# Patient Record
Sex: MALE | Race: WHITE | Employment: OTHER | URBAN - METROPOLITAN AREA
[De-identification: names, ages, dates, MRNs, and addresses within clinical notes are randomized per-mention and may not be internally consistent; named-entity substitution may affect disease eponyms.]

---

## 2021-11-09 ENCOUNTER — HOSPITAL ENCOUNTER (OUTPATIENT)
Dept: PHYSICAL THERAPY | Age: 66
Discharge: HOME | End: 2021-11-09
Payer: MEDICARE

## 2021-11-09 PROCEDURE — 97110 THERAPEUTIC EXERCISES: CPT

## 2021-11-09 PROCEDURE — 97161 PT EVAL LOW COMPLEX 20 MIN: CPT

## 2021-11-09 NOTE — PROGRESS NOTES
PHYSICAL THERAPY EVALUATION / DAILY TREATMENT      Patient Name: Joe Fuller  Date: 2021  : 1955  [x] Patient  Verified  Payor: Jalen Alt / Plan: [place] / Product Type: Medicare /    In Time: 12:30  Out Time: 1:11  Total Treatment Time (min): 41  Visit #: 1 of 16    Medicare / Robe Carvalho Only   Total Timed Codes (min):  8 1:1 Treatment Time:  41     Treatment Area: Pain in left shoulder [M25.512]  Impingement syndrome of left shoulder [M75.42]    SUBJECTIVE:  Chief Complaint/Mechanism of Injury:   Patient is a very pleasant 73 y.o. male with c/o left shoulder pain that started while doing household projects about 3 months ago. Patient states he tripped on something in the garage and fell directly on his left shoulder. Patient states his pain is now a \"constant ache\" along the top of his left shoulder. He c/o difficulty and/or painful when reaching behind him to the left, reaching > 90 degrees into both flexion and scaption. He points to the anterior aspect of his left GHJ as the location of his pain, but states there is some minor ache in his left deltoid region. He denies any symptoms in his distal UE and also no c/o tingling, numbness, or burning. He also denies any known weakness of his left distal UE, but feels as though he is weak in his left shoulder. He c/o difficulty with lifting more than a few pounds, he prefers to lift with his contralateral UE at this time. He denies any prior history of injury of either shoulder, but does reports several years of intermittent cervical pain including years of limited cervical rotation. Patient states years ago he was told he has cervical DDD, but he is unsure of the levels that were involved. Patient states his neck pain over the last year has improved, but it did flare up slightly after his fall when he landed on his shoulder. Patient is left hand dominant, including writing and throwing.   He is retired, his hobbies include hunting and traveling to the mountains. 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 Patient reports receiving a cortisone injection about a week ago, which he has found helpful. Pain Level (out of 0-10 scale): pain ranges from 2-9/10, currently 5/10  [] constant [x] intermittent [x] improving [] worsening [] no change since onset  Alleviating Factors: injection, Advil 3x/day  Previous Treatment for Current Injury: injections  Diagnostic Imaging for Current Injury: x-rays - no fractures  Hand Dominance: [] right handed [x] left handed [] ambidextrous  Living Situation: lives with wife in a 2 story home with 3 steps to enter, [] with handrail  Prior Level of Function:  Yardwork, hunting, traveling  [x] Unrestricted with functional activities and ADLs  [] No assistive device  [x] active lifestyle [] moderately active lifestyle [] sedentary lifestyle  [] Other:     Substance Use: [] tobacco use [x] alcohol use [] other:     Comorbidities: chronic neck pain with limited cervical rotation, cervical DDD, DM type 2, OA left shoulder and left knee, hearing impaired (wears hearing aides), left knee meniscal surgery about 12 years ago    Patients Goals:  \"to have more movement without pain\"    Potential Barriers for PT: [] pain [] financial [] time [] transportation [] other:  Motivation: Good  Cognition: A&O x 3  Denies Red Flags: SOB, chest pain, dizziness/lightheadedness, blurred/double vision, HA, chills/fevers, night sweats, change in bowel/bladder control, abdominal pain, difficulty swallowing, slurred speech, unexplained weight gain/loss, nausea, and/or vomiting.   Any medication changes, allergies to medications, adverse drug reactions, diagnosis change, or new procedure performed?: [x] No     [] Yes (see summary sheet for update)      OBJECTIVE/EXAMINATION:    33 min [x] Eval                  [] Re-Evaluation     8 min Therapeutic Exercise:  [x] See flow sheet :   Rationale: patient education of shoulder anatomy including the rotator cuff, cervical anatomy, and posture        With   [x] TE   [] TA   [] Neuro   [] Other: Patient Education: [x] Review HEP    [] Progressed/Changed HEP based on:   [] positioning   [] body mechanics   [] transfers   [] heat/ice application    [] other:      Physical Therapy Evaluation:    Inspection:   [x] Patient in no apparent distress                   [] Patient in obvious distress  [x] No visible signs/symptoms of infection  [] Edema present: [] mild [] moderate [] severe [] 1+ pitting [] 2+ pitting   Location:   [] Ecchymosis present: [] mild [] moderate [] severe   Location:     Posture: mildly kyphotic posture with loss of cervical lordosis and forward shoulders    Gait: WNL    Palpation: TTP along anterior GHJ at attachment of RC tendons    AROM (in degrees): Cervical AROM: flexion to chest, ext to 20 deg, left rotation to 41 deg, right rotation to 62 deg, left sidebending to 17 deg, right sidebending to 22 deg; all movements without c/o pain however c/o stiffness ; Bilateral UE AROM: shoulder flexion 140 deg with pain starting around 100 degrees, scaption 125 deg with pain starting around 90 degrees, ER 50 deg (at 0 deg)    MMT: Grossly 4+/5 bilaterally except left shoulder ER 4-/5 (all within available range)    Girth Measurements (in cm): n/a    Special Tests: - empty can, - Acuña/Lloyd    Other Comments: none     Pain Level (out of 0-10 scale) Post Treatment: 5      ASSESSMENT:  Patient is a very pleasant 73 y.o. male presenting to skilled PT with c/o left shoulder pain after tripping on an object in his garage and landing directly on his left shoulder about 3-4 months ago. Patient's symptoms are consistent with rotator cuff pathology, though there is no obvious clinical presentation of a tear.   He presents today with limitations in posture, cervical and shoulder AROM, UE strength, thus all contributing to deficits with the overall functional use of his left UE. Patient is left hand dominant and states he is currently having to use his right UE to do most lifting, pushing, and pulling, because of left shoulder pain. Patient would benefit from skilled PT services to modify and progress therapeutic interventions, address functional mobility deficits, address ROM deficits, address strength deficits, analyze and address soft tissue restrictions, analyze and cue movement patterns, analyze and modify body mechanics/ergonomics, and assess and modify postural abnormalities to attain remaining goals. Patient did well with today's evaluation and initiation of treatment. Patient was educated in [address] and all questions were answered. [x]  See Plan of Care  []  See Progress Note/Recertification  []  See Discharge Summary         GOALS:  Short Term Goals: to be accomplished within 3 weeks  1. Patient will report compliance with prescribed HEP at least 1x/day in order to assist in progression towards goals and restore functional mobility. Eval Status: HEP issued at eval  Current Status: same as eval    2. Patient will report at least a 50% reduction in pain when performing ADLs/functional activities in order to improve overall tolerance to functional movements and progress towards patient's PLOF. Eval Status: pain ranges from 2-9/10 with all use/mobility of his left shoulder  Current Status: same as eval    Long Term Goals: to be accomplished within 8 weeks  1. Patient will score at least 68 points on FOTO in order to maximize function and promote patient satisfaction with overall outcome. (Cheryl Dice is an established functional score where 100 = no disability)  Eval Status: 53  Current Status: same as eval    2. Patient will demonstrate functional AROM that is with 0-2/10 pain in order to return to functional activities and mobility.   Eval Status: Cervical AROM: flexion to chest, ext to 20 deg, left rotation to 41 deg, right rotation to 62 deg, left sidebending to 17 deg, right sidebending to 22 deg; all movements without c/o pain however c/o stiffness ; Bilateral UE AROM: shoulder flexion 140 deg with pain starting around 100 degrees, scaption 125 deg with pain starting around 90 degrees, ER 50 deg (at 0 deg)  Current Status: same as eval    3. Patient will demonstrate at least 5/5 strength bilaterally in order to be able to safely perform functional activities. Eval Status: Grossly 4+/5 bilaterally except left shoulder ER 4-/5 (all within available range)  Current Status: same as eval    4. Patient will be painfree during all functional activities and ADLs in order to improve QOL and return to patient's PLOF. Eval Status: pain ranges from 2-9/10 with all use/mobility of his left shoulder  Current Status: same as eval      PLAN  [x]  Continue Plan of Care  []  Upgrade Activities as Tolerated       [x]  Update Interventions per Flow Sheet, as Tolerated by Patient       []  Discharge Due To:   []  Other: Thank you for the referral to [facility]. Fernanda Boswell.  Marci, PT, DPT, ATR     2021     10:08 AM      Patient Name: Jie Christianson     Patient : 1955

## 2021-11-09 NOTE — PROGRESS NOTES
[facility] at [address] [place] [address]  Phone: [phone]   Fax: [phone]    Plan of Care/ Statement of Necessity for Physical Therapy Services     Patient name: Kathrin Ramirez Start of Care: 2021   Referral source: Donn Mohan MD : 1955    Medical Diagnosis: Pain in left shoulder [M25.512]  Impingement syndrome of left shoulder [M75.42]   Onset Date:3-4 months ago    Treatment Diagnosis: left shoulder pain with possible rotator cuff pathology   Prior Hospitalization: see medical history Provider#: [id number]   Medications: Verified on Patient summary List    Comorbidities: chronic neck pain with limited cervical rotation, cervical DDD, DM type 2, OA left shoulder and left knee, hearing impaired (wears hearing aides), left knee meniscal surgery about 12 years ago   Prior Level of Function: unrestricted with all functional activities/movements, left hand dominant, active with hunting, traveling, and yardwork    The [address] [place] [address] and following information is based on the information from the initial evaluation. Assessment/ key information: Patient is a very pleasant 73 y.o. male presenting to skilled PT with c/o left shoulder pain after tripping on an object in his garage and landing directly on his left shoulder about 3-4 months ago. Patient's symptoms are consistent with rotator cuff pathology, though there is no obvious clinical presentation of a tear. He presents today with limitations in posture, cervical and shoulder AROM, UE strength, thus all contributing to deficits with the overall functional use of his left UE.   Patient is left hand dominant and states he is currently having to use his right UE to do most lifting, pushing, and pulling, because of left shoulder pain.     Patient would benefit from skilled PT services to modify and progress therapeutic interventions, address functional mobility deficits, address ROM deficits, address strength deficits, analyze and address soft tissue restrictions, analyze and cue movement patterns, analyze and modify body mechanics/ergonomics, and assess and modify postural abnormalities to attain remaining goals. Evaluation Complexity History MEDIUM  Complexity : 1-2 comorbidities / personal factors will impact the outcome/ POC ; Examination LOW Complexity : 1-2 Standardized tests and measures addressing body structure, function, activity limitation and / or participation in recreation  ;Presentation LOW Complexity : Stable, uncomplicated  ;Clinical Decision Making MEDIUM Complexity : FOTO score of 26-74  Overall Complexity Rating: LOW   Problem List: pain affecting function, decrease ROM, decrease strength, decrease ADL/ functional abilitiies, decrease activity tolerance and decrease flexibility/ joint mobility   Treatment Plan may include any combination of the following: Therapeutic exercise, Therapeutic activities, Neuromuscular re-education, Physical agent/modality, Manual therapy, Aquatic therapy, Patient education, Self Care training and Functional mobility training  Patient / Family readiness to learn indicated by: asking questions, trying to perform skills and interest  Persons(s) to be included in education: patient (P)  Barriers to Learning/Limitations: None  Measures taken if barriers to learning:   Patient Goal (s): to have more movement without pain\"  Patient Self Reported Health Status: excellent  Rehabilitation Potential: excellent    Short Term Goals: to be accomplished within 3 weeks  1. Patient will report compliance with prescribed HEP at least 1x/day in order to assist in progression towards goals and restore functional mobility. Eval Status: HEP issued at eval  Current Status: same as eval     2.  Patient will report at least a 50% reduction in pain when performing ADLs/functional activities in order to improve overall tolerance to functional movements and progress towards patient's PLOF.  Eval Status: pain ranges from 2-9/10 with all use/mobility of his left shoulder  Current Status: same as eval     Long Term Goals: to be accomplished within 8 weeks  1. Patient will score at least 68 points on FOTO in order to maximize function and promote patient satisfaction with overall outcome. (Viona Villanova is an established functional score where 100 = no disability)  Eval Status: 53  Current Status: same as eval     2. Patient will demonstrate functional AROM that is with 0-2/10 pain in order to return to functional activities and mobility. Eval Status: Cervical AROM: flexion to chest, ext to 20 deg, left rotation to 41 deg, right rotation to 62 deg, left sidebending to 17 deg, right sidebending to 22 deg; all movements without c/o pain however c/o stiffness ; Bilateral UE AROM: shoulder flexion 140 deg with pain starting around 100 degrees, scaption 125 deg with pain starting around 90 degrees, ER 50 deg (at 0 deg)  Current Status: same as eval     3. Patient will demonstrate at least 5/5 strength bilaterally in order to be able to safely perform functional activities. Eval Status: Grossly 4+/5 bilaterally except left shoulder ER 4-/5 (all within available range)  Current Status: same as eval     4. Patient will be painfree during all functional activities and ADLs in order to improve QOL and return to patient's PLOF. Eval Status: pain ranges from 2-9/10 with all use/mobility of his left shoulder  Current Status: same as eval    Frequency / Duration: Patient to be seen 2 times per week for 8 weeks. Patient/ Caregiver education and instruction: Diagnosis, prognosis, activity modification   [x]  Plan of care has been reviewed with PTA    Certification Period: 11/9/21-2/7/22    Jolene Howe, PT 11/9/2021 10:08 AM  _____________________________________________________________________  I certify that the above Therapy Services are being furnished while the patient is under my care.  I agree with the treatment plan and certify that this therapy is necessary.     Physician's Signature:____________Date:_________TIME:________                                      Ivanna Avila MD    ** Signature, Date and Time must be completed for valid certification **    Please sign and return to [facility] at [facility], [address] [place] [address]  Phone: [phone]   Fax: [phone]

## 2021-11-10 ENCOUNTER — HOSPITAL ENCOUNTER (OUTPATIENT)
Dept: PHYSICAL THERAPY | Age: 66
Discharge: HOME | End: 2021-11-10
Payer: MEDICARE

## 2021-11-10 PROCEDURE — 97530 THERAPEUTIC ACTIVITIES: CPT

## 2021-11-10 PROCEDURE — 97110 THERAPEUTIC EXERCISES: CPT

## 2021-11-10 NOTE — PROGRESS NOTES
PT DAILY TREATMENT NOTE    Patient Name: Joe Fuller  Date: 11/10/2021  : 1955  [x]  Patient  Verified  Payor: Jalen Alt / Plan: [place] MEDICARE PART A & B / Product Type: Medicare /    In Time: 8:00  Out Time: 8:46  Total Treatment Time (min): 46  Total Timed Codes (min): 46  1:1 Treatment Time ([facility] only): 45   Visit #:     Treatment Dx: Pain in left shoulder [M25.512]  Impingement syndrome of left shoulder [M75.42]    SUBJECTIVE  Pre-Treatment Pain Level (0-10 scale): 3    Any medication changes, allergies to medications, adverse drug reactions, diagnosis change, or new procedure performed?: [x] No    [] Yes (see summary sheet for update)    Subjective Functional Status/Changes:  [] No changes reported  Patient reports no issues after his initial evaluation. No changes in symptoms as of yet. OBJECTIVE    25 min Therapeutic Exercise:  [x] See flow sheet   Rationale: increase ROM, increase strength, and decrease pain to improve the patients ability to perform ADLs    13 min Therapeutic Activity:  [x] See flow sheet   Rationale: increase ROM, increase strength, and improve coordination to improve the patients ability to perform ADLs      With   [x] TE   [] TA   [] neuro   [] other: Patient Education: [x] Review HEP    [] Progressed/Changed HEP based on:   [] positioning   [] body mechanics   [] transfers   [] heat/ice application    [] other:      Other Objective/Functional Measures: N/A     Pain Level (0-10 scale) Post Treatment: 3    ASSESSMENT/Changes in Function:  Patient responded well to today's treatment without any adverse reactions. Patient did well with the initiation of exercises today, which included: UBE, pulleys, standing wand flexion/scaption, flex/scap to 90 deg with light weight, shrugs, bicep curls, rows/exts with resistance band, and IR/ER with resistance band. Cues given for upright posture and scapular stabilization.   HEP reviewed and given to patient today, in addition to green resistance bands for home use. Patient will continue to benefit from skilled PT services to further modify and progress therapeutic interventions, address functional mobility deficits, address ROM deficits, address strength deficits, analyze and address soft tissue restrictions, analyze and cue movement patterns, analyze and modify body mechanics/ergonomics, assess and modify postural abnormalities, and instruct in home and community integration to attain remaining goals. [x]  See Plan of Care  []  See Progress Note/Recertification  []  See Discharge Summary         Progress Towards Goals/Updated Goals:  Short Term Goals: to be accomplished within 3 weeks  1. Patient will report compliance with prescribed HEP at least 1x/day in order to assist in progression towards goals and restore functional mobility. Eval Status: issued at second visit, 11/10/21  Current Status: same as eval     2. Patient will report at least a 50% reduction in pain when performing ADLs/functional activities in order to improve overall tolerance to functional movements and progress towards patient's PLOF. Eval Status: pain ranges from 2-9/10 with all use/mobility of his left shoulder  Current Status: same as eval     Long Term Goals: to be accomplished within 8 weeks  1. Patient will score at least 68 points on FOTO in order to maximize function and promote patient satisfaction with overall outcome. (Sheridan Abraham is an established functional score where 100 = no disability)  Eval Status: 53  Current Status: same as eval     2. Patient will demonstrate functional AROM that is with 0-2/10 pain in order to return to functional activities and mobility.   Eval Status: Cervical AROM: flexion to chest, ext to 20 deg, left rotation to 41 deg, right rotation to 62 deg, left sidebending to 17 deg, right sidebending to 22 deg; all movements without c/o pain however c/o stiffness ; Bilateral UE AROM: shoulder flexion 140 deg with pain starting around 100 degrees, scaption 125 deg with pain starting around 90 degrees, ER 50 deg (at 0 deg)  Current Status: same as eval     3. Patient will demonstrate at least 5/5 strength bilaterally in order to be able to safely perform functional activities. Eval Status: Grossly 4+/5 bilaterally except left shoulder ER 4-/5 (all within available range)  Current Status: same as eval     4. Patient will be painfree during all functional activities and ADLs in order to improve QOL and return to patient's PLOF. Eval Status: pain ranges from 2-9/10 with all use/mobility of his left shoulder  Current Status: same as eval    PLAN  []  Upgrade Activities as Tolerated     [x]  Continue Plan of Care  []  Update Interventions per Flow Sheet       []  Discharge Due To:_  []  Other:_      Russella Nails.  Marci, PT, DPT, ATR  11/10/2021 7:50 AM    Future Appointments   Date Time Provider Yue Stout   11/10/2021  8:00 AM Kt Covarrubias Nails, PT [facility]   11/15/2021  8:00 AM Kt Covarrubias Nails, PT [facility]   11/17/2021  8:45 AM Johnnie Covarrubiasa Nails, PT [facility]   11/22/2021  8:00 AM Kt Covarrubias Nails, PT [facility]   11/23/2021  8:00 AM Jordana Sullivan, PT [facility]   11/29/2021  8:00 AM Kt Covarrubias Nails, PT [facility]   12/1/2021  8:45 AM Johnnie Covarrubiasa Nails, PT [facility]

## 2021-11-15 ENCOUNTER — HOSPITAL ENCOUNTER (OUTPATIENT)
Dept: PHYSICAL THERAPY | Age: 66
Discharge: HOME | End: 2021-11-15
Payer: MEDICARE

## 2021-11-15 PROCEDURE — 97110 THERAPEUTIC EXERCISES: CPT

## 2021-11-15 PROCEDURE — 97530 THERAPEUTIC ACTIVITIES: CPT

## 2021-11-15 NOTE — PROGRESS NOTES
PT DAILY TREATMENT NOTE    Patient Name: Dulce Ramon  Date: 11/15/2021  : 1955  [x]  Patient  Verified  Payor: [employer] / Plan: [place] / Product Type: Medicare /    In Time: 7:57  Out Time: 8:51  Total Treatment Time (min): 54  Total Timed Codes (min): 54  1:1 Treatment Time ( [address] only): 48   Visit #: 3/16    Treatment Dx: Pain in left shoulder [M25.512]  Impingement syndrome of left shoulder [M75.42]    SUBJECTIVE  Pre-Treatment Pain Level (0-10 scale): 1    Any medication changes, allergies to medications, adverse drug reactions, diagnosis change, or new procedure performed?: [x] No    [] Yes (see summary sheet for update)    Subjective Functional Status/Changes:  [] No changes reported  \"My shoulder feels much better already. I was even able to throw a ball this weekend. I only threw it once though just to test it out. \"  Patient states he didn't have any lower weights, so he's been using 10# weights at home for his exercises. OBJECTIVE    30 min Therapeutic Exercise:  [x] See flow sheet   Rationale: increase ROM, increase strength, and decrease pain to improve the patients ability to perform ADLs    23 min Therapeutic Activity:  [x] See flow sheet   Rationale: increase ROM, increase strength, and improve coordination to improve the patients ability to perform ADLs      With   [x] TE   [] TA   [] neuro   [] other: Patient Education: [x] Review HEP    [] Progressed/Changed HEP based on:   [] positioning   [] body mechanics   [] transfers   [] heat/ice application    [] other:      Other Objective/Functional Measures: N/A     Pain Level (0-10 scale) Post Treatment: 0    ASSESSMENT/Changes in Function:  Patient responded well to today's treatment without any adverse reactions. Patient educated on importance of lesser weights at this time to avoid reinjuring his shoulder. AROM seems to be improving.   Added further scapular and cervical stabilization exercises to today's exercises. Patient will continue to benefit from skilled PT services to further modify and progress therapeutic interventions, address functional mobility deficits, address ROM deficits, address strength deficits, analyze and address soft tissue restrictions, analyze and cue movement patterns, analyze and modify body mechanics/ergonomics, assess and modify postural abnormalities, and instruct in home and community integration to attain remaining goals. [x]  See Plan of Care  []  See Progress Note/Recertification  []  See Discharge Summary         Progress Towards Goals/Updated Goals:    Short Term Goals: to be accomplished within 3 weeks  1. Patient will report compliance with prescribed HEP at least 1x/day in order to assist in progression towards goals and restore functional mobility. Eval Status: issued at second visit, 11/10/21  Current Status: Patient reports compliance with his HEP 1x/day. 11/15/21, met     2. Patient will report at least a 50% reduction in pain when performing ADLs/functional activities in order to improve overall tolerance to functional movements and progress towards patient's PLOF. Eval Status: pain ranges from 2-9/10 with all use/mobility of his left shoulder  Current Status: same as eval      Long Term Goals: to be accomplished within 8 weeks  1. Patient will score at least 68 points on FOTO in order to maximize function and promote patient satisfaction with overall outcome.  (FOTO is an established functional score where 100 = no disability)  Eval Status: 53  Current Status: same as eval     2.  Patient will demonstrate functional AROM that is with 0-2/10 pain in order to return to functional activities and mobility.   Eval Status: Cervical AROM: flexion to chest, ext to 20 deg, left rotation to 41 deg, right rotation to 62 deg, left sidebending to 17 deg, right sidebending to 22 deg; all movements without c/o pain however c/o stiffness ; Bilateral UE AROM: shoulder flexion 140 deg with pain starting around 100 degrees, scaption 125 deg with pain starting around 90 degrees, ER 50 deg (at 0 deg)  Current Status: same as eval     3.  Patient will demonstrate at least 5/5 strength bilaterally in order to be able to safely perform functional activities. Eval Status: Grossly 4+/5 bilaterally except left shoulder ER 4-/5 (all within available range)  Current Status: same as eval     4.  Patient will be painfree during all functional activities and ADLs in order to improve QOL and return to patient's PLOF. Eval Status: pain ranges from 2-9/10 with all use/mobility of his left shoulder  Current Status: same as eval    PLAN  []  Upgrade Activities as Tolerated     [x]  Continue Plan of Care  []  Update Interventions per Flow Sheet       []  Discharge Due To:_  []  Other:_      Yocasta Merritt.  JEANNE Covarrubias, DPT, ATR  11/15/2021 8:05 AM    Future Appointments   Date Time Provider Yue Stout   11/17/2021  8:45 AM [facility] [place] [facility]   11/22/2021  8:00 AM Yocasta Covarrubias, PT [facility]   11/23/2021  8:00 AM JEANNE LebronTREBECCA THE [facility]   11/29/2021  8:00 AM Yocasta Covarrubias, PT [facility]   12/1/2021  8:45 AM Ning Romano PT [facility]

## 2021-11-17 ENCOUNTER — HOSPITAL ENCOUNTER (OUTPATIENT)
Dept: PHYSICAL THERAPY | Age: 66
Discharge: HOME | End: 2021-11-17
Payer: MEDICARE

## 2021-11-17 PROCEDURE — 97110 THERAPEUTIC EXERCISES: CPT

## 2021-11-17 PROCEDURE — 97530 THERAPEUTIC ACTIVITIES: CPT

## 2021-11-17 NOTE — PROGRESS NOTES
PT DAILY TREATMENT NOTE    Patient Name: Meena Javier  Date:2021  : 1955  [x]  Patient  Verified  Payor: Yane [employer] / Plan: [place] MED[place] PART A [place] / Product Type: Medicare /    In time:842  Out time:931  Total Treatment Time (min): 49  Total Timed Codes (min): 49  1:1 Treatment Time ( [address] only): 37   Visit #: 4 of 16    Treatment Dx: Pain in left shoulder [M25.512]  Impingement syndrome of left shoulder [M75.42]    SUBJECTIVE  Pain Level (0-10 scale): 0  Any medication changes, allergies to medications, adverse drug reactions, diagnosis change, or new procedure performed?: [x] No    [] Yes (see summary sheet for update)  Subjective functional status/changes:   [] No changes reported  \"The pain has come way down since I started the exercises. Still hurts at the front when I touch it and when I move certain ways. \"    OBJECTIVE    30 min Therapeutic Exercise:  [x] See flow sheet :   Rationale: increase ROM, increase strength and decrease pain to improve the patients ability to complete ADLs  ambulation safety and efficiency in order to improve patient's ability to safely ambulate at home for self care. 13 min Therapeutic Activity:  [x]  See flow sheet :   Rationale: increase ROM, increase strength and improve coordination  to improve the patients ability to Complete ADLS     With   [] TE   [] TA   [] neuro   [] other: Patient Education: [x] Review HEP    [] Progressed/Changed HEP based on:   [] positioning   [] body mechanics   [] transfers   [] heat/ice application    [] other:      Other Objective/Functional Measures: NA     Pain Level (0-10 scale) post treatment: 0    ASSESSMENT/Changes in Function: Patient noting marked decrease in pain intensity with gentle exercises. Patient requires moderate intermittent verbal and visual cues for technique and sequencing of exercise program. Patient responds well to treatment session. No adverse effects were noted from today's treatment session. Patient will continue to benefit from skilled PT services to modify and progress therapeutic interventions, address functional mobility deficits, address ROM deficits, address strength deficits, analyze and address soft tissue restrictions, analyze and cue movement patterns, analyze and modify body mechanics/ergonomics, assess and modify postural abnormalities,  and instruct in home and community integration to attain remaining goals. [x]  See Plan of Care  []  See progress note/recertification  []  See Discharge Summary         Progress towards goals / Updated goals:  Short Term Goals: to be accomplished within 3 weeks  1. Patient will report compliance with prescribed HEP at least 1x/day in order to assist in progression towards goals and restore functional mobility. Eval Status: issued at second visit, 11/10/21  Current Status: Patient reports compliance with his HEP 1x/day. 11/15/21, met     2. Patient will report at least a 50% reduction in pain when performing ADLs/functional activities in order to improve overall tolerance to functional movements and progress towards patient's PLOF. Eval Status: pain ranges from 2-9/10 with all use/mobility of his left shoulder  Current Status: same as eval      Long Term Goals: to be accomplished within 8 weeks  1. Patient will score at least 68 points on FOTO in order to maximize function and promote patient satisfaction with overall outcome.  (FOTO is an established functional score where 100 = no disability)  Eval Status: 53  Current Status: same as eval     2.  Patient will demonstrate functional AROM that is with 0-2/10 pain in order to return to functional activities and mobility.   Eval Status: Cervical AROM: flexion to chest, ext to 20 deg, left rotation to 41 deg, right rotation to 62 deg, left sidebending to 17 deg, right sidebending to 22 deg; all movements without c/o pain however c/o stiffness ; Bilateral UE AROM: shoulder flexion 140 deg with pain starting around 100 degrees, scaption 125 deg with pain starting around 90 degrees, ER 50 deg (at 0 deg)  Current Status: same as eval     3.  Patient will demonstrate at least 5/5 strength bilaterally in order to be able to safely perform functional activities. Eval Status: Grossly 4+/5 bilaterally except left shoulder ER 4-/5 (all within available range)  Current Status: same as eval     4.  Patient will be painfree during all functional activities and ADLs in order to improve QOL and return to patient's PLOF.   Eval Status: pain ranges from 2-9/10 with all use/mobility of his left shoulder  Current Status: same as eval    PLAN  []  Upgrade activities as tolerated     [x]  Continue plan of care  []  Update interventions per flow sheet       []  Discharge due to:_  []  Other:_      Yves Garcia, PT 11/17/2021  8:47 AM    Future Appointments   Date Time Provider Yue Stout   11/22/2021  8:00 AM Sandip Covarrubias, PT [facility]   11/23/2021  8:00 AM Rosa Arredondo, PT [facility]   11/29/2021  8:00 AM Sandpi Covarrubias, PT [facility]   12/1/2021  8:45 AM Med Mcfarlane, PT [facility]

## 2021-11-22 ENCOUNTER — HOSPITAL ENCOUNTER (OUTPATIENT)
Dept: PHYSICAL THERAPY | Age: 66
Discharge: HOME | End: 2021-11-22
Payer: MEDICARE

## 2021-11-22 PROCEDURE — 97112 NEUROMUSCULAR REEDUCATION: CPT

## 2021-11-22 PROCEDURE — 97110 THERAPEUTIC EXERCISES: CPT

## 2021-11-22 PROCEDURE — 97530 THERAPEUTIC ACTIVITIES: CPT

## 2021-11-22 NOTE — PROGRESS NOTES
PT DAILY TREATMENT NOTE    Patient Name: Eva Robertson  Date: 2021  : 1955  [x]  Patient  Verified  Payor: Pat [employer] / Plan: [place] MED[place] / Product Type: Medicare /    In Time: 7:58  Out Time: 8:57  Total Treatment Time (min): 59  Total Timed Codes (min): 59  1:1 Treatment Time ( [address] only): 48   Visit #:     Treatment Dx: Pain in left shoulder [M25.512]  Impingement syndrome of left shoulder [M75.42]    SUBJECTIVE  Pre-Treatment Pain Level (0-10 scale): 0    Any medication changes, allergies to medications, adverse drug reactions, diagnosis change, or new procedure performed?: [x] No    [] Yes (see summary sheet for update)    Subjective Functional Status/Changes:  [] No changes reported  Patient states he's mostly not having any pain nowadays, except for when he drives with his left UE propped up onto the doorframe. He feels like therapy has been really helping.     OBJECTIVE    17 min Therapeutic Exercise:  [x] See flow sheet   Rationale: increase ROM, increase strength, and decrease pain to improve the patients ability to perform ADLs    26 min Therapeutic Activity:  [x] See flow sheet   Rationale: increase ROM, increase strength, and improve coordination to improve the patients ability to perform ADLs    10 min Neuromuscular Re-Education:  [x] See flow sheet   Rationale: improve coordination, improve balance/proprioception, improve posture, and improve core stabilization to improve the patients ability to perform ADLs and improve stability during static/dynamic movements      With   [x] TE   [] TA   [] neuro   [] other: Patient Education: [x] Review HEP    [] Progressed/Changed HEP based on:   [] positioning   [] body mechanics   [] transfers   [] heat/ice application    [] other:      Other Objective/Functional Measures: N/A     Pain Level (0-10 scale) Post Treatment: 0    ASSESSMENT/Changes in Function:  Patient responded well to today's treatment without any adverse reactions. Patient with much improved AROM as compared to his initial visit, however, he does still c/o minor pain at the end-range of flexion. His quality of movement in all planes has also greatly improved. Patient did well with addition of D1/D2 PNF patterns, without any c/o pain. Patient will continue to benefit from skilled PT services to further modify and progress therapeutic interventions, address functional mobility deficits, address ROM deficits, address strength deficits, analyze and address soft tissue restrictions, analyze and cue movement patterns, analyze and modify body mechanics/ergonomics, assess and modify postural abnormalities, and instruct in home and community integration to attain remaining goals. [x]  See Plan of Care  []  See Progress Note/Recertification  []  See Discharge Summary         Progress Towards Goals/Updated Goals:    Short Term Goals: to be accomplished within 3 weeks  1. Patient will report compliance with prescribed HEP at least 1x/day in order to assist in progression towards goals and restore functional mobility. Eval Status: issued at second visit, 11/10/21  Current Status: Patient reports compliance with his HEP 1x/day.    11/15/21, met     2. Patient will report at least a 50% reduction in pain when performing ADLs/functional activities in order to improve overall tolerance to functional movements and progress towards patient's PLOF. Eval Status: pain ranges from 2-9/10 with all use/mobility of his left shoulder  Current Status: pain ranges 0-3 within the last week   11/22/21, met     Long Term Goals: to be accomplished within 8 weeks  1.  Patient will score at least 68 points on FOTO in order to maximize function and promote patient satisfaction with overall outcome.  (FOTO is an established functional score where 100 = no disability)  Eval Status: 53  Current Status: 87 on 11/22/21, met     2.  Patient will demonstrate functional AROM that is with 0-2/10 pain in order to return to functional activities and mobility. Eval Status: Cervical AROM: flexion to chest, ext to 20 deg, left rotation to 41 deg, right rotation to 62 deg, left sidebending to 17 deg, right sidebending to 22 deg; all movements without c/o pain however c/o stiffness ; Bilateral UE AROM: shoulder flexion 140 deg with pain starting around 100 degrees, scaption 125 deg with pain starting around 90 degrees, ER 50 deg (at 0 deg)  Current Status: Left Shoulder AROM: WNL and without pain except flexion to 160 with minor pain at end-range    11/22/21, met     3.  Patient will demonstrate at least 5/5 strength bilaterally in order to be able to safely perform functional activities. Eval Status: Grossly 4+/5 bilaterally except left shoulder ER 4-/5 (all within available range)  Current Status: same as eval     4.  Patient will be painfree during all functional activities and ADLs in order to improve QOL and return to patient's PLOF. Eval Status: pain ranges from 2-9/10 with all use/mobility of his left shoulder  Current Status: pain ranges 0-3 within the last week   11/22/21, met    PLAN  []  Upgrade Activities as Tolerated     [x]  Continue Plan of Care  []  Update Interventions per Flow Sheet       []  Discharge Due To:_  []  Other:_      Deepti Spaulding.  JEANNE Covarrubias, DPT, ATR  11/22/2021 7:56 AM    Future Appointments   Date Time Provider Yue Stout   11/22/2021  8:00 AM Deepti Covarrubias PT [facility]   11/23/2021  8:00 AM Mikaela Perez, JEANNE [facility]   11/29/2021  8:00 AM Deepti Covarrubias, PT [facility]   12/1/2021  8:45 AM Oriana Romano, JEANNE [facility]

## 2021-11-23 ENCOUNTER — HOSPITAL ENCOUNTER (OUTPATIENT)
Dept: PHYSICAL THERAPY | Age: 66
Discharge: HOME | End: 2021-11-23
Payer: MEDICARE

## 2021-11-23 PROCEDURE — 97112 NEUROMUSCULAR REEDUCATION: CPT

## 2021-11-23 PROCEDURE — 97110 THERAPEUTIC EXERCISES: CPT

## 2021-11-23 PROCEDURE — 97530 THERAPEUTIC ACTIVITIES: CPT

## 2021-11-23 NOTE — PROGRESS NOTES
PT DAILY TREATMENT NOTE - [facility]     Patient Name: Joe Fuller  Date:2021  : 1955  [x]  Patient  Verified  Payor: Jalen Alt / Plan: [place] MED[place]ARE PART A & B / Product Type: Medicare /    In ECUL:7692  Out time:0850  Total Treatment Time (min): 53  Total Timed Codes (min): 53   1:1 Treatment Time ( [address] only): 48   Visit #: 6 of 16    Treatment Area: Pain in left shoulder [M25.512]  Impingement syndrome of left shoulder [M75.42]    SUBJECTIVE  Pain Level (0-10 scale): 0  Any medication changes, allergies to medications, adverse drug reactions, diagnosis change, or new procedure performed?: [x] No    [] Yes (see summary sheet for update)  Subjective functional status/changes:   [] No changes reported    Patient reports no new changes since last visit. OBJECTIVE    35 min Therapeutic Exercise:  [x] See flow sheet :   Rationale: increase ROM, increase strength and decrease pain to improve the patients ability to complete ADLs    10 min Therapeutic Activity:  [x]  See flow sheet :   Rationale: increase ROM, increase strength and improve coordination  to improve the patients ability to complete ADLs     8 min Neuromuscular Re-education:  [x]  See flow sheet :   Rationale: improve coordination, improve balance/proprioception, improve posture, and improve core stabilization to improve the patients ability to perform ADLs and improve stability during static/dynamic movements          With   [x] TE   [] TA   [] neuro   [] other: Patient Education: [x] Review HEP    [] Progressed/Changed HEP based on:   [] positioning   [] body mechanics   [] transfers   [] heat/ice application    [] other:      Other Objective/Functional Measures: NA     Pain Level (0-10 scale) post treatment: 0    ASSESSMENT/Changes in Function: Patient responds well to treatment session. Patient required cues to recall exercise parameters. Reviewed activity pacing to promote increased activity tolerance due to muscle recovery time. He demonstrated understanding. Will advance exercise intensity next visit as patient demonstrated improved activity tolerance. No adverse effects were noted from today's treatment session      Patient will continue to benefit from skilled PT services to modify and progress therapeutic interventions, address functional mobility deficits, address ROM deficits, address strength deficits, analyze and address soft tissue restrictions, analyze and cue movement patterns, analyze and modify body mechanics/ergonomics, assess and modify postural abnormalities, and instruct in home and community integration to attain remaining goals. []  See Plan of Care  []  See progress note/recertification  []  See Discharge Summary         Progress towards goals / Updated goals:  Short Term Goals: to be accomplished within 3 weeks  1. Patient will report compliance with prescribed HEP at least 1x/day in order to assist in progression towards goals and restore functional mobility. Eval Status: issued at second visit, 11/10/21  Current Status: Patient reports compliance with his HEP 1x/day.    11/15/21, met     2. Patient will report at least a 50% reduction in pain when performing ADLs/functional activities in order to improve overall tolerance to functional movements and progress towards patient's PLOF. Eval Status: pain ranges from 2-9/10 with all use/mobility of his left shoulder  Current Status: pain ranges 0-3 within the last week   11/22/21, met     Long Term Goals: to be accomplished within 8 weeks  1. Patient will score at least 68 points on FOTO in order to maximize function and promote patient satisfaction with overall outcome.  (FOTO is an established functional score where 100 = no disability)  Eval Status: 53  Current Status: 87 on 11/22/21, met     2.  Patient will demonstrate functional AROM that is with 0-2/10 pain in order to return to functional activities and mobility.   Eval Status: Cervical AROM: flexion to chest, ext to 20 deg, left rotation to 41 deg, right rotation to 62 deg, left sidebending to 17 deg, right sidebending to 22 deg; all movements without c/o pain however c/o stiffness ; Bilateral UE AROM: shoulder flexion 140 deg with pain starting around 100 degrees, scaption 125 deg with pain starting around 90 degrees, ER 50 deg (at 0 deg)  Current Status: Left Shoulder AROM: WNL and without pain except flexion to 160 with minor pain at end-range    11/22/21, met     3.  Patient will demonstrate at least 5/5 strength bilaterally in order to be able to safely perform functional activities. Eval Status: Grossly 4+/5 bilaterally except left shoulder ER 4-/5 (all within available range)  Current Status: same as eval     4.  Patient will be painfree during all functional activities and ADLs in order to improve QOL and return to patient's PLOF.   Eval Status: pain ranges from 2-9/10 with all use/mobility of his left shoulder  Current Status: pain ranges 0-3 within the last week   11/22/21, met    PLAN  []  Upgrade activities as tolerated     [x]  Continue plan of care  []  Update interventions per flow sheet       []  Discharge due to:_  []  Other:_      Julia Abreu, PT, DPT 11/23/2021  7:55 AM    Future Appointments   Date Time Provider Yue Stout   11/23/2021  8:00 AM Renea Pacheco, PT [facility]   11/29/2021  8:00 AM Earl Covarrubias, PT [facility]   12/1/2021  8:45 AM Bhumi Romano, PT [facility]

## 2021-11-29 ENCOUNTER — HOSPITAL ENCOUNTER (OUTPATIENT)
Dept: PHYSICAL THERAPY | Age: 66
Discharge: HOME | End: 2021-11-29
Payer: MEDICARE

## 2021-11-29 PROCEDURE — 97110 THERAPEUTIC EXERCISES: CPT

## 2021-11-29 PROCEDURE — 97530 THERAPEUTIC ACTIVITIES: CPT

## 2021-11-29 PROCEDURE — 97112 NEUROMUSCULAR REEDUCATION: CPT

## 2021-11-29 NOTE — PROGRESS NOTES
PT DAILY TREATMENT NOTE    Patient Name: Emily Lujan  Date:2021  : 1955  [x]  Patient  Verified  [place]or: [place] / Plan: [place] MED[place] A & B / Product Type: Medicare /    In time:800  Out time:844  Total Treatment Time (min): 44  Total Timed Codes (min): 44  1:1 Treatment Time ( [address] only): 40   Visit #: 7 of 16    Treatment Dx: Pain in left shoulder [M25.512]  Impingement syndrome of left shoulder [M75.42]    SUBJECTIVE  Pain Level (0-10 scale): 0  Any medication changes, allergies to medications, adverse drug reactions, diagnosis change, or new procedure performed?: [x] No    [] Yes (see summary sheet for update)  Subjective functional status/changes:   [] No changes reported  \"The exercises are really helping. I would say I am about 95% back to normal.\"    OBJECTIVE    24 min Therapeutic Exercise:  [x] See flow sheet :   Rationale: increase ROM, increase strength and decrease pain to improve the patients ability to complete ADLs  ambulation safety and efficiency in order to improve patient's ability to safely ambulate at home for self care. 10 min Therapeutic Activity:  [x]  See flow sheet :   Rationale: increase ROM, increase strength and improve coordination  to improve the patients ability to Complete ADLS     10 min Neuromuscular Re-education:  [x]  See flow sheet :   Rationale: improve coordination, improve balance and increase proprioception  to improve the patients ability to complete ADLS, and decrease falls risk    With   [] TE   [] TA   [] neuro   [] other: Patient Education: [x] Review HEP    [] Progressed/Changed HEP based on:   [] positioning   [] body mechanics   [] transfers   [] heat/ice application    [] other:      Other Objective/Functional Measures: NA     Pain Level (0-10 scale) post treatment: 0    ASSESSMENT/Changes in Function: Patient noting almost complete resolution of symptoms and almost full restoration of function.  Will reassess next session to determine if patient is ready for transition to independent self care. Patient responds well to treatment session. No adverse effects were noted from today's treatment session. Patient will continue to benefit from skilled PT services to modify and progress therapeutic interventions, address functional mobility deficits, address ROM deficits, address strength deficits, analyze and address soft tissue restrictions, analyze and cue movement patterns, analyze and modify body mechanics/ergonomics, assess and modify postural abnormalities,  and instruct in home and community integration to attain remaining goals. [x]  See Plan of Care  []  See progress note/recertification  []  See Discharge Summary         Progress towards goals / Updated goals:  Short Term Goals: to be accomplished within 3 weeks  1. Patient will report compliance with prescribed HEP at least 1x/day in order to assist in progression towards goals and restore functional mobility. Eval Status: issued at second visit, 11/10/21  Current Status: Patient reports compliance with his HEP 1x/day.    11/15/21, met     2. Patient will report at least a 50% reduction in pain when performing ADLs/functional activities in order to improve overall tolerance to functional movements and progress towards patient's PLOF. Eval Status: pain ranges from 2-9/10 with all use/mobility of his left shoulder  Current Status: pain ranges 0-1 past few days.   11/29/21, met     Long Term Goals: to be accomplished within 8 weeks  1. Patient will score at least 68 points on FOTO in order to maximize function and promote patient satisfaction with overall outcome.  (FOTO is an established functional score where 100 = no disability)  Eval Status: 53  Current Status: 87 on 11/22/21, met     2.  Patient will demonstrate functional AROM that is with 0-2/10 pain in order to return to functional activities and mobility.   Eval Status: Cervical AROM: flexion to chest, ext to 20 deg, left rotation to 41 deg, right rotation to 62 deg, left sidebending to 17 deg, right sidebending to 22 deg; all movements without c/o pain however c/o stiffness ; Bilateral UE AROM: shoulder flexion 140 deg with pain starting around 100 degrees, scaption 125 deg with pain starting around 90 degrees, ER 50 deg (at 0 deg)  Current Status: Left Shoulder AROM: WNL and without pain except flexion to 160 with minor pain at end-range    11/22/21, met     3.  Patient will demonstrate at least 5/5 strength bilaterally in order to be able to safely perform functional activities. Eval Status: Grossly 4+/5 bilaterally except left shoulder ER 4-/5 (all within available range)  Current Status: same as eval     4.  Patient will be painfree during all functional activities and ADLs in order to improve QOL and return to patient's PLOF.   Eval Status: pain ranges from 2-9/10 with all use/mobility of his left shoulder  Current Status: pain ranges 0-3 within the last week   11/22/21, met    PLAN  []  Upgrade activities as tolerated     [x]  Continue plan of care  []  Update interventions per flow sheet       []  Discharge due to:_  []  Other:_      Martinez Worthington, JEANNE 11/29/2021  8:19 AM    Future Appointments   Date Time Provider Yue Stout   12/1/2021  8:45 AM Nate Romano [facility]

## 2021-12-01 ENCOUNTER — HOSPITAL ENCOUNTER (OUTPATIENT)
Dept: PHYSICAL THERAPY | Age: 66
Discharge: HOME | End: 2021-12-01
Payer: MEDICARE

## 2021-12-01 PROCEDURE — 97110 THERAPEUTIC EXERCISES: CPT

## 2021-12-01 PROCEDURE — 97112 NEUROMUSCULAR REEDUCATION: CPT

## 2021-12-01 PROCEDURE — 97530 THERAPEUTIC ACTIVITIES: CPT

## 2021-12-01 NOTE — PROGRESS NOTES
PT DAILY TREATMENT NOTE    Patient Name: Edison Sepulveda  Date:2021  : 1955  [x]  Patient  Verified  Payor: [place] MEDICARE / Plan: [place] MEDICARE PART A & B / Product Type: Medicare /    In time:844  Out time:932  Total Treatment Time (min): 48  Total Timed Codes (min): 48  1:1 Treatment Time ( [address] only): 40   Visit #: 8 of 16    Treatment Dx: Pain in left shoulder [M25.512]  Impingement syndrome of left shoulder [M75.42]    SUBJECTIVE  Pain Level (0-10 scale): 0  Any medication changes, allergies to medications, adverse drug reactions, diagnosis change, or new procedure performed?: [x] No    [] Yes (see summary sheet for update)  Subjective functional status/changes:   [] No changes reported  \"I am feeling real good. I think I am ready to stop PT and continue the exercises on my own. \"    OBJECTIVE    24 min Therapeutic Exercise:  [x] See flow sheet :   Rationale: increase ROM, increase strength and decrease pain to improve the patients ability to complete ADLs  ambulation safety and efficiency in order to improve patient's ability to safely ambulate at home for self care.         10 min Therapeutic Activity:  [x]  See flow sheet :   Rationale: increase ROM, increase strength and improve coordination  to improve the patients ability to Complete ADLS     10 min Neuromuscular Re-education:  [x]  See flow sheet :   Rationale: improve coordination, improve balance and increase proprioception  to improve the patients ability to complete ADLS, and decrease falls risk    With   [] TE   [] TA   [] neuro   [] other: Patient Education: [x] Review HEP    [] Progressed/Changed HEP based on:   [] positioning   [] body mechanics   [] transfers   [] heat/ice application    [] other:      Other Objective/Functional Measures: NA     Pain Level (0-10 scale) post treatment: 0    ASSESSMENT/Changes in Function: Patient has been well motivated, consistent and diligent with PT and with HEP and as a result has made excellent progress towards goals such that he has met all PT goals and is now ready for discharge from PT. Patient responds well to treatment session. No adverse effects were noted from today's treatment session. [x]  See Plan of Care  []  See progress note/recertification  [x]  See Discharge Summary         Progress towards goals / Updated goals:  Short Term Goals: to be accomplished within 3 weeks  1. Patient will report compliance with prescribed HEP at least 1x/day in order to assist in progression towards goals and restore functional mobility. Eval Status: issued at second visit, 11/10/21  Current Status: Patient reports compliance with his HEP 1x/day.    11/15/21, met     2. Patient will report at least a 50% reduction in pain when performing ADLs/functional activities in order to improve overall tolerance to functional movements and progress towards patient's PLOF. Eval Status: pain ranges from 2-9/10 with all use/mobility of his left shoulder  Current Status: pain ranges 0-1 past few days.   11/29/21, met     Long Term Goals: to be accomplished within 8 weeks  1. Patient will score at least 68 points on FOTO in order to maximize function and promote patient satisfaction with overall outcome.  (FOTO is an established functional score where 100 = no disability)  Eval Status: 53  Current Status: 87 on 11/22/21, met     2.  Patient will demonstrate functional AROM that is with 0-2/10 pain in order to return to functional activities and mobility.   Eval Status: Cervical AROM: flexion to chest, ext to 20 deg, left rotation to 41 deg, right rotation to 62 deg, left sidebending to 17 deg, right sidebending to 22 deg; all movements without c/o pain however c/o stiffness ; Bilateral UE AROM: shoulder flexion 140 deg with pain starting around 100 degrees, scaption 125 deg with pain starting around 90 degrees, ER 50 deg (at 0 deg)  Current Status: Left Shoulder AROM: WNL and without pain except flexion to 160 with minor pain at end-range    11/22/21, met     3.  Patient will demonstrate at least 5/5 strength bilaterally in order to be able to safely perform functional activities. Eval Status: Grossly 4+/5 bilaterally except left shoulder ER 4-/5 (all within available range)  Current Status: left shoulder strength improved to 5/5.    12/1/2021, met     4.  Patient will be painfree during all functional activities and ADLs in order to improve QOL and return to patient's PLOF. Eval Status: pain ranges from 2-9/10 with all use/mobility of his left shoulder  Current Status: pain ranges 0-3 within the last week   11/22/21, met    PLAN  []  Upgrade activities as tolerated     [x]  Continue plan of care  []  Update interventions per flow sheet       [x]  Discharge due to:_goals met  []  Other:_      Gee Ontiveros, PT 12/1/2021  9:00 AM    No future appointments.

## 2021-12-01 NOTE — PROGRESS NOTES
[facility] at [address] [place] [address] Drive: [phone]   Fax: [phone]  Discharge Summary  Patient name: Latasha Alva Start of Care: 2021   Referral source: Edgar Pollard MD : [id number]               Medical Diagnosis: Pain in left shoulder [M25.512]  Impingement syndrome of left shoulder [M75.42]    Onset Date:3-4 months ago               Treatment Diagnosis: left shoulder pain with possible rotator cuff pathology   Prior Hospitalization: see medical history Provider#: [id number]   Medications: Verified on Patient summary List    Comorbidities: chronic neck pain with limited cervical rotation, cervical DDD, DM type 2, OA left shoulder and left knee, hearing impaired (wears hearing aides), left knee meniscal surgery about 12 years ago   Prior Level of Function: unrestricted with all functional activities/movements, left hand dominant, active with hunting, traveling, and yardwork  ===========================================================================================  Assessment / Summary of Care:  Latasha Alva is a 73 y.o.   male who has been well motivated, consistent and diligent with PT and with HEP and as a result has made excellent progress towards goals such that he has met all PT goals and is now ready for discharge from PT.    ===========================================================================================    Plan: Discharge to independent HEP. Progress Towards Goals:     Short Term Goals: to be accomplished within 3 weeks  1. Patient will report compliance with prescribed HEP at least 1x/day in order to assist in progression towards goals and restore functional mobility. Eval Status: issued at second visit, 11/10/21  Current Status: Patient reports compliance with his HEP 1x/day.    11/15/21, met     2.  Patient will report at least a 50% reduction in pain when performing ADLs/functional activities in order to improve overall tolerance to functional movements and progress towards patient's PLOF. Eval Status: pain ranges from 2-9/10 with all use/mobility of his left shoulder  Current Status: pain ranges 0-1 past few days.   11/29/21, met     Long Term Goals: to be accomplished within 8 weeks  1. Patient will score at least 68 points on FOTO in order to maximize function and promote patient satisfaction with overall outcome.  (FOTO is an established functional score where 100 = no disability)  Eval Status: 53  Current Status: 87 on 11/22/21, met     2.  Patient will demonstrate functional AROM that is with 0-2/10 pain in order to return to functional activities and mobility. Eval Status: Cervical AROM: flexion to chest, ext to 20 deg, left rotation to 41 deg, right rotation to 62 deg, left sidebending to 17 deg, right sidebending to 22 deg; all movements without c/o pain however c/o stiffness ; Bilateral UE AROM: shoulder flexion 140 deg with pain starting around 100 degrees, scaption 125 deg with pain starting around 90 degrees, ER 50 deg (at 0 deg)  Current Status: Left Shoulder AROM: WNL and without pain except flexion to 160 with minor pain at end-range    11/22/21, met     3.  Patient will demonstrate at least 5/5 strength bilaterally in order to be able to safely perform functional activities. Eval Status: Grossly 4+/5 bilaterally except left shoulder ER 4-/5 (all within available range)  Current Status: left shoulder strength improved to 5/5.    12/1/2021, met     4.  Patient will be painfree during all functional activities and ADLs in order to improve QOL and return to patient's PLOF.   Eval Status: pain ranges from 2-9/10 with all use/mobility of his left shoulder  Current Status: pain ranges 0-3 within the last week   11/22/21, met    ===========================================================================================    Therapist Signature: Iwona Beckford PT, DPT Date: 12/1/2021     Time: 9:42 AM

## 2021-12-01 NOTE — PROGRESS NOTES
Physical Therapy Discharge Instructions    [facility] at [address] [place] [address]  Phone: [phone]   Fax: [phone]      Patient: Meena Javier  : 1955    Continue Home Exercise Program 1-2 times per day for 4 weeks, then decrease to 3 times per week      Continue with    [x] Ice  as needed      [x] Heat           Follow up with MD:     [] Upon completion of therapy     [x] As needed      Recommendations:     [x]   Return to activity with home program    []   Return to activity with the following modifications:       []Post Rehab Program    []Join Independent aquatic program     []Return to/join local gym      Additional Comments: Please contact clinic at above phone number if you have any questions regarding Home Exercise Program or self care instructions.       Jessica Leung, PT 2021 9:45 AM